# Patient Record
Sex: MALE | Race: WHITE | NOT HISPANIC OR LATINO | Employment: OTHER | URBAN - METROPOLITAN AREA
[De-identification: names, ages, dates, MRNs, and addresses within clinical notes are randomized per-mention and may not be internally consistent; named-entity substitution may affect disease eponyms.]

---

## 2019-10-21 ENCOUNTER — CONVERSION ENCOUNTER (OUTPATIENT)
Dept: GASTROENTEROLOGY | Facility: CLINIC | Age: 49
End: 2019-10-21
Attending: INTERNAL MEDICINE

## 2019-12-16 ENCOUNTER — HOSPITAL ENCOUNTER (OUTPATIENT)
Dept: GASTROENTEROLOGY | Facility: HOSPITAL | Age: 49
Setting detail: HOSPITAL OUTPATIENT SURGERY
Discharge: HOME | End: 2019-12-16
Attending: INTERNAL MEDICINE

## 2022-01-11 ENCOUNTER — HOSPITAL ENCOUNTER (INPATIENT)
Facility: HOSPITAL | Age: 52
LOS: 1 days | Discharge: HOME | End: 2022-01-12
Attending: EMERGENCY MEDICINE | Admitting: INTERNAL MEDICINE

## 2022-01-11 ENCOUNTER — APPOINTMENT (OUTPATIENT)
Dept: CARDIOLOGY | Facility: HOSPITAL | Age: 52
End: 2022-01-11

## 2022-01-11 ENCOUNTER — APPOINTMENT (OUTPATIENT)
Dept: GENERAL RADIOLOGY | Facility: HOSPITAL | Age: 52
End: 2022-01-11

## 2022-01-11 ENCOUNTER — APPOINTMENT (OUTPATIENT)
Dept: CT IMAGING | Facility: HOSPITAL | Age: 52
End: 2022-01-11

## 2022-01-11 DIAGNOSIS — R55 SYNCOPE, UNSPECIFIED SYNCOPE TYPE: ICD-10-CM

## 2022-01-11 DIAGNOSIS — S09.90XA INJURY OF HEAD, INITIAL ENCOUNTER: ICD-10-CM

## 2022-01-11 DIAGNOSIS — I48.91 NEW ONSET ATRIAL FIBRILLATION: Primary | ICD-10-CM

## 2022-01-11 DIAGNOSIS — S06.0X9A CONCUSSION WITH LOSS OF CONSCIOUSNESS, INITIAL ENCOUNTER: ICD-10-CM

## 2022-01-11 LAB
ALBUMIN SERPL-MCNC: 4.6 G/DL (ref 3.5–5.2)
ALBUMIN/GLOB SERPL: 2 G/DL
ALP SERPL-CCNC: 92 U/L (ref 39–117)
ALT SERPL W P-5'-P-CCNC: 13 U/L (ref 1–41)
ANION GAP SERPL CALCULATED.3IONS-SCNC: 10.5 MMOL/L (ref 5–15)
ANION GAP SERPL CALCULATED.3IONS-SCNC: 10.6 MMOL/L (ref 5–15)
ASCENDING AORTA: 3.3 CM
AST SERPL-CCNC: 19 U/L (ref 1–40)
BASOPHILS # BLD AUTO: 0.04 10*3/MM3 (ref 0–0.2)
BASOPHILS # BLD AUTO: 0.04 10*3/MM3 (ref 0–0.2)
BASOPHILS NFR BLD AUTO: 0.4 % (ref 0–1.5)
BASOPHILS NFR BLD AUTO: 0.4 % (ref 0–1.5)
BH CV ECHO MEAS - AO ROOT DIAM: 3.1 CM
BH CV ECHO MEAS - EDV(MOD-SP2): 80 ML
BH CV ECHO MEAS - EDV(MOD-SP4): 75 ML
BH CV ECHO MEAS - EF(MOD-BP): 60 %
BH CV ECHO MEAS - ESV(MOD-SP2): 24 ML
BH CV ECHO MEAS - ESV(MOD-SP4): 39 ML
BH CV ECHO MEAS - IVSD: 1 CM
BH CV ECHO MEAS - LA DIMENSION(2D): 3.7 CM
BH CV ECHO MEAS - LVIDD: 4.2 CM
BH CV ECHO MEAS - LVIDS: 3 CM
BH CV ECHO MEAS - LVOT DIAM: 2 CM
BH CV ECHO MEAS - LVPWD: 1 CM
BH CV ECHO MEAS - MV A MAX VEL: 23 CM/SEC
BH CV ECHO MEAS - MV DEC TIME: 188 MSEC
BH CV ECHO MEAS - MV E MAX VEL: 65 CM/SEC
BH CV ECHO MEAS - MV E/A: 2.9
BH CV ECHO MEAS - RAP SYSTOLE: 3 MMHG
BH CV ECHO MEAS - RVDD: 2.5 CM
BH CV ECHO MEAS - RVSP: 10 MMHG
BH CV ECHO MEAS - TR MAX PG: 7 MMHG
BH CV ECHO MEAS - TR MAX VEL: 134 CM/SEC
BILIRUB SERPL-MCNC: 0.5 MG/DL (ref 0–1.2)
BILIRUB UR QL STRIP: NEGATIVE
BUN SERPL-MCNC: 10 MG/DL (ref 6–20)
BUN SERPL-MCNC: 14 MG/DL (ref 6–20)
BUN/CREAT SERPL: 12 (ref 7–25)
BUN/CREAT SERPL: 16.1 (ref 7–25)
CALCIUM SPEC-SCNC: 8.7 MG/DL (ref 8.6–10.5)
CALCIUM SPEC-SCNC: 8.8 MG/DL (ref 8.6–10.5)
CHLORIDE SERPL-SCNC: 100 MMOL/L (ref 98–107)
CHLORIDE SERPL-SCNC: 96 MMOL/L (ref 98–107)
CLARITY UR: CLEAR
CO2 SERPL-SCNC: 24.4 MMOL/L (ref 22–29)
CO2 SERPL-SCNC: 26.5 MMOL/L (ref 22–29)
COLOR UR: YELLOW
CREAT SERPL-MCNC: 0.83 MG/DL (ref 0.76–1.27)
CREAT SERPL-MCNC: 0.87 MG/DL (ref 0.76–1.27)
DEPRECATED RDW RBC AUTO: 39.8 FL (ref 37–54)
DEPRECATED RDW RBC AUTO: 40.4 FL (ref 37–54)
EOSINOPHIL # BLD AUTO: 0.06 10*3/MM3 (ref 0–0.4)
EOSINOPHIL # BLD AUTO: 0.17 10*3/MM3 (ref 0–0.4)
EOSINOPHIL NFR BLD AUTO: 0.6 % (ref 0.3–6.2)
EOSINOPHIL NFR BLD AUTO: 1.8 % (ref 0.3–6.2)
ERYTHROCYTE [DISTWIDTH] IN BLOOD BY AUTOMATED COUNT: 12.4 % (ref 12.3–15.4)
ERYTHROCYTE [DISTWIDTH] IN BLOOD BY AUTOMATED COUNT: 12.6 % (ref 12.3–15.4)
ETHANOL BLD-MCNC: <10 MG/DL (ref 0–10)
ETHANOL UR QL: <0.01 %
GFR SERPL CREATININE-BSD FRML MDRD: 92 ML/MIN/1.73
GFR SERPL CREATININE-BSD FRML MDRD: 97 ML/MIN/1.73
GLOBULIN UR ELPH-MCNC: 2.3 GM/DL
GLUCOSE BLDC GLUCOMTR-MCNC: 119 MG/DL (ref 70–99)
GLUCOSE SERPL-MCNC: 133 MG/DL (ref 65–99)
GLUCOSE SERPL-MCNC: 178 MG/DL (ref 65–99)
GLUCOSE UR STRIP-MCNC: NEGATIVE MG/DL
HCT VFR BLD AUTO: 43.1 % (ref 37.5–51)
HCT VFR BLD AUTO: 43.4 % (ref 37.5–51)
HGB BLD-MCNC: 15.2 G/DL (ref 13–17.7)
HGB BLD-MCNC: 15.3 G/DL (ref 13–17.7)
HGB UR QL STRIP.AUTO: NEGATIVE
HOLD SPECIMEN: NORMAL
IMM GRANULOCYTES # BLD AUTO: 0.03 10*3/MM3 (ref 0–0.05)
IMM GRANULOCYTES # BLD AUTO: 0.05 10*3/MM3 (ref 0–0.05)
IMM GRANULOCYTES NFR BLD AUTO: 0.3 % (ref 0–0.5)
IMM GRANULOCYTES NFR BLD AUTO: 0.5 % (ref 0–0.5)
IVRT: 92 MSEC
KETONES UR QL STRIP: NEGATIVE
LEUKOCYTE ESTERASE UR QL STRIP.AUTO: NEGATIVE
LYMPHOCYTES # BLD AUTO: 1.92 10*3/MM3 (ref 0.7–3.1)
LYMPHOCYTES # BLD AUTO: 2.39 10*3/MM3 (ref 0.7–3.1)
LYMPHOCYTES NFR BLD AUTO: 19.1 % (ref 19.6–45.3)
LYMPHOCYTES NFR BLD AUTO: 25.1 % (ref 19.6–45.3)
MAGNESIUM SERPL-MCNC: 2 MG/DL (ref 1.6–2.6)
MAGNESIUM SERPL-MCNC: 2.1 MG/DL (ref 1.6–2.6)
MAXIMAL PREDICTED HEART RATE: 168 BPM
MCH RBC QN AUTO: 30.9 PG (ref 26.6–33)
MCH RBC QN AUTO: 30.9 PG (ref 26.6–33)
MCHC RBC AUTO-ENTMCNC: 35.3 G/DL (ref 31.5–35.7)
MCHC RBC AUTO-ENTMCNC: 35.3 G/DL (ref 31.5–35.7)
MCV RBC AUTO: 87.6 FL (ref 79–97)
MCV RBC AUTO: 87.7 FL (ref 79–97)
MONOCYTES # BLD AUTO: 0.37 10*3/MM3 (ref 0.1–0.9)
MONOCYTES # BLD AUTO: 0.57 10*3/MM3 (ref 0.1–0.9)
MONOCYTES NFR BLD AUTO: 3.7 % (ref 5–12)
MONOCYTES NFR BLD AUTO: 6 % (ref 5–12)
NEUTROPHILS NFR BLD AUTO: 6.34 10*3/MM3 (ref 1.7–7)
NEUTROPHILS NFR BLD AUTO: 66.4 % (ref 42.7–76)
NEUTROPHILS NFR BLD AUTO: 7.62 10*3/MM3 (ref 1.7–7)
NEUTROPHILS NFR BLD AUTO: 75.7 % (ref 42.7–76)
NITRITE UR QL STRIP: NEGATIVE
NRBC BLD AUTO-RTO: 0 /100 WBC (ref 0–0.2)
NRBC BLD AUTO-RTO: 0 /100 WBC (ref 0–0.2)
PH UR STRIP.AUTO: 8 [PH] (ref 5–8)
PHOSPHATE SERPL-MCNC: 3.3 MG/DL (ref 2.5–4.5)
PLATELET # BLD AUTO: 236 10*3/MM3 (ref 140–450)
PLATELET # BLD AUTO: 254 10*3/MM3 (ref 140–450)
PMV BLD AUTO: 9.1 FL (ref 6–12)
PMV BLD AUTO: 9.5 FL (ref 6–12)
POTASSIUM SERPL-SCNC: 3.8 MMOL/L (ref 3.5–5.2)
POTASSIUM SERPL-SCNC: 3.8 MMOL/L (ref 3.5–5.2)
PROT SERPL-MCNC: 6.9 G/DL (ref 6–8.5)
PROT UR QL STRIP: NEGATIVE
QT INTERVAL: 364 MS
RBC # BLD AUTO: 4.92 10*6/MM3 (ref 4.14–5.8)
RBC # BLD AUTO: 4.95 10*6/MM3 (ref 4.14–5.8)
SODIUM SERPL-SCNC: 133 MMOL/L (ref 136–145)
SODIUM SERPL-SCNC: 135 MMOL/L (ref 136–145)
SP GR UR STRIP: 1.01 (ref 1–1.03)
STRESS TARGET HR: 143 BPM
T4 FREE SERPL-MCNC: 1.5 NG/DL (ref 0.93–1.7)
TROPONIN I SERPL-MCNC: 0.01 NG/ML (ref 0–0.6)
TROPONIN T SERPL-MCNC: <0.01 NG/ML (ref 0–0.03)
TSH SERPL DL<=0.05 MIU/L-ACNC: 2.56 UIU/ML (ref 0.27–4.2)
UROBILINOGEN UR QL STRIP: NORMAL
WBC NRBC COR # BLD: 10.06 10*3/MM3 (ref 3.4–10.8)
WBC NRBC COR # BLD: 9.54 10*3/MM3 (ref 3.4–10.8)
WHOLE BLOOD HOLD SPECIMEN: NORMAL
WHOLE BLOOD HOLD SPECIMEN: NORMAL

## 2022-01-11 PROCEDURE — 83735 ASSAY OF MAGNESIUM: CPT | Performed by: HOSPITALIST

## 2022-01-11 PROCEDURE — 25010000002 AMIODARONE IN DEXTROSE 5% 360-4.14 MG/200ML-% SOLUTION: Performed by: INTERNAL MEDICINE

## 2022-01-11 PROCEDURE — 25010000002 AMIODARONE IN DEXTROSE 5% 150-4.21 MG/100ML-% SOLUTION: Performed by: INTERNAL MEDICINE

## 2022-01-11 PROCEDURE — 71045 X-RAY EXAM CHEST 1 VIEW: CPT

## 2022-01-11 PROCEDURE — 63710000001 PROMETHAZINE PER 12.5 MG: Performed by: INTERNAL MEDICINE

## 2022-01-11 PROCEDURE — 99284 EMERGENCY DEPT VISIT MOD MDM: CPT

## 2022-01-11 PROCEDURE — 25010000002 ONDANSETRON PER 1 MG: Performed by: EMERGENCY MEDICINE

## 2022-01-11 PROCEDURE — 93306 TTE W/DOPPLER COMPLETE: CPT

## 2022-01-11 PROCEDURE — 84484 ASSAY OF TROPONIN QUANT: CPT

## 2022-01-11 PROCEDURE — 93306 TTE W/DOPPLER COMPLETE: CPT | Performed by: INTERNAL MEDICINE

## 2022-01-11 PROCEDURE — 70450 CT HEAD/BRAIN W/O DYE: CPT

## 2022-01-11 PROCEDURE — 93005 ELECTROCARDIOGRAM TRACING: CPT

## 2022-01-11 PROCEDURE — 82962 GLUCOSE BLOOD TEST: CPT

## 2022-01-11 PROCEDURE — 84443 ASSAY THYROID STIM HORMONE: CPT | Performed by: EMERGENCY MEDICINE

## 2022-01-11 PROCEDURE — 84484 ASSAY OF TROPONIN QUANT: CPT | Performed by: EMERGENCY MEDICINE

## 2022-01-11 PROCEDURE — 82077 ASSAY SPEC XCP UR&BREATH IA: CPT | Performed by: EMERGENCY MEDICINE

## 2022-01-11 PROCEDURE — 99253 IP/OBS CNSLTJ NEW/EST LOW 45: CPT | Performed by: INTERNAL MEDICINE

## 2022-01-11 PROCEDURE — 93005 ELECTROCARDIOGRAM TRACING: CPT | Performed by: EMERGENCY MEDICINE

## 2022-01-11 PROCEDURE — 99223 1ST HOSP IP/OBS HIGH 75: CPT | Performed by: HOSPITALIST

## 2022-01-11 PROCEDURE — 72125 CT NECK SPINE W/O DYE: CPT

## 2022-01-11 PROCEDURE — 80053 COMPREHEN METABOLIC PANEL: CPT | Performed by: EMERGENCY MEDICINE

## 2022-01-11 PROCEDURE — 85025 COMPLETE CBC W/AUTO DIFF WBC: CPT | Performed by: EMERGENCY MEDICINE

## 2022-01-11 PROCEDURE — 81003 URINALYSIS AUTO W/O SCOPE: CPT | Performed by: EMERGENCY MEDICINE

## 2022-01-11 PROCEDURE — 83735 ASSAY OF MAGNESIUM: CPT | Performed by: EMERGENCY MEDICINE

## 2022-01-11 PROCEDURE — 85025 COMPLETE CBC W/AUTO DIFF WBC: CPT | Performed by: HOSPITALIST

## 2022-01-11 PROCEDURE — 84439 ASSAY OF FREE THYROXINE: CPT | Performed by: EMERGENCY MEDICINE

## 2022-01-11 PROCEDURE — 93010 ELECTROCARDIOGRAM REPORT: CPT | Performed by: INTERNAL MEDICINE

## 2022-01-11 PROCEDURE — 84100 ASSAY OF PHOSPHORUS: CPT | Performed by: HOSPITALIST

## 2022-01-11 RX ORDER — ACETAMINOPHEN 650 MG/1
650 SUPPOSITORY RECTAL EVERY 4 HOURS PRN
Status: DISCONTINUED | OUTPATIENT
Start: 2022-01-11 | End: 2022-01-12 | Stop reason: HOSPADM

## 2022-01-11 RX ORDER — SODIUM CHLORIDE 0.9 % (FLUSH) 0.9 %
10 SYRINGE (ML) INJECTION AS NEEDED
Status: DISCONTINUED | OUTPATIENT
Start: 2022-01-11 | End: 2022-01-12 | Stop reason: HOSPADM

## 2022-01-11 RX ORDER — ACETAMINOPHEN 325 MG/1
650 TABLET ORAL EVERY 4 HOURS PRN
Status: DISCONTINUED | OUTPATIENT
Start: 2022-01-11 | End: 2022-01-12 | Stop reason: HOSPADM

## 2022-01-11 RX ORDER — BISACODYL 5 MG/1
5 TABLET, DELAYED RELEASE ORAL DAILY PRN
Status: DISCONTINUED | OUTPATIENT
Start: 2022-01-11 | End: 2022-01-12 | Stop reason: HOSPADM

## 2022-01-11 RX ORDER — ACETAMINOPHEN 325 MG/1
650 TABLET ORAL ONCE
Status: COMPLETED | OUTPATIENT
Start: 2022-01-11 | End: 2022-01-11

## 2022-01-11 RX ORDER — SODIUM CHLORIDE 9 MG/ML
100 INJECTION, SOLUTION INTRAVENOUS CONTINUOUS
Status: DISCONTINUED | OUTPATIENT
Start: 2022-01-11 | End: 2022-01-11

## 2022-01-11 RX ORDER — POLYETHYLENE GLYCOL 3350 17 G/17G
17 POWDER, FOR SOLUTION ORAL DAILY PRN
Status: DISCONTINUED | OUTPATIENT
Start: 2022-01-11 | End: 2022-01-12 | Stop reason: HOSPADM

## 2022-01-11 RX ORDER — ACETAMINOPHEN 325 MG/1
650 TABLET ORAL EVERY 4 HOURS PRN
Status: DISCONTINUED | OUTPATIENT
Start: 2022-01-11 | End: 2022-01-11

## 2022-01-11 RX ORDER — PROMETHAZINE HYDROCHLORIDE 12.5 MG/1
12.5 TABLET ORAL ONCE
Status: COMPLETED | OUTPATIENT
Start: 2022-01-11 | End: 2022-01-11

## 2022-01-11 RX ORDER — ACETAMINOPHEN 650 MG/1
650 SUPPOSITORY RECTAL EVERY 4 HOURS PRN
Status: DISCONTINUED | OUTPATIENT
Start: 2022-01-11 | End: 2022-01-11

## 2022-01-11 RX ORDER — AMOXICILLIN 250 MG
2 CAPSULE ORAL 2 TIMES DAILY
Status: DISCONTINUED | OUTPATIENT
Start: 2022-01-11 | End: 2022-01-12 | Stop reason: HOSPADM

## 2022-01-11 RX ORDER — CHOLECALCIFEROL (VITAMIN D3) 125 MCG
5 CAPSULE ORAL NIGHTLY PRN
Status: DISCONTINUED | OUTPATIENT
Start: 2022-01-11 | End: 2022-01-12 | Stop reason: HOSPADM

## 2022-01-11 RX ORDER — DILTIAZEM HYDROCHLORIDE 5 MG/ML
10 INJECTION INTRAVENOUS ONCE
Status: COMPLETED | OUTPATIENT
Start: 2022-01-11 | End: 2022-01-11

## 2022-01-11 RX ORDER — ONDANSETRON 4 MG/1
4 TABLET, FILM COATED ORAL EVERY 6 HOURS PRN
Status: DISCONTINUED | OUTPATIENT
Start: 2022-01-11 | End: 2022-01-12 | Stop reason: HOSPADM

## 2022-01-11 RX ORDER — SODIUM CHLORIDE 0.9 % (FLUSH) 0.9 %
10 SYRINGE (ML) INJECTION EVERY 12 HOURS SCHEDULED
Status: DISCONTINUED | OUTPATIENT
Start: 2022-01-11 | End: 2022-01-12 | Stop reason: HOSPADM

## 2022-01-11 RX ORDER — ONDANSETRON 2 MG/ML
4 INJECTION INTRAMUSCULAR; INTRAVENOUS EVERY 6 HOURS PRN
Status: DISCONTINUED | OUTPATIENT
Start: 2022-01-11 | End: 2022-01-12 | Stop reason: HOSPADM

## 2022-01-11 RX ORDER — BISACODYL 10 MG
10 SUPPOSITORY, RECTAL RECTAL DAILY PRN
Status: DISCONTINUED | OUTPATIENT
Start: 2022-01-11 | End: 2022-01-12 | Stop reason: HOSPADM

## 2022-01-11 RX ORDER — ACETAMINOPHEN 160 MG/5ML
650 SOLUTION ORAL EVERY 4 HOURS PRN
Status: DISCONTINUED | OUTPATIENT
Start: 2022-01-11 | End: 2022-01-12 | Stop reason: HOSPADM

## 2022-01-11 RX ORDER — ONDANSETRON 2 MG/ML
4 INJECTION INTRAMUSCULAR; INTRAVENOUS ONCE
Status: COMPLETED | OUTPATIENT
Start: 2022-01-11 | End: 2022-01-11

## 2022-01-11 RX ORDER — ACETAMINOPHEN 160 MG/5ML
650 SOLUTION ORAL EVERY 4 HOURS PRN
Status: DISCONTINUED | OUTPATIENT
Start: 2022-01-11 | End: 2022-01-11

## 2022-01-11 RX ORDER — NITROGLYCERIN 0.4 MG/1
0.4 TABLET SUBLINGUAL
Status: DISCONTINUED | OUTPATIENT
Start: 2022-01-11 | End: 2022-01-12 | Stop reason: HOSPADM

## 2022-01-11 RX ADMIN — AMIODARONE HYDROCHLORIDE 150 MG: 1.5 INJECTION, SOLUTION INTRAVENOUS at 10:20

## 2022-01-11 RX ADMIN — APIXABAN 5 MG: 5 TABLET, FILM COATED ORAL at 20:08

## 2022-01-11 RX ADMIN — SODIUM CHLORIDE 100 ML/HR: 9 INJECTION, SOLUTION INTRAVENOUS at 05:29

## 2022-01-11 RX ADMIN — SODIUM CHLORIDE, PRESERVATIVE FREE 10 ML: 5 INJECTION INTRAVENOUS at 20:07

## 2022-01-11 RX ADMIN — METOPROLOL TARTRATE 12.5 MG: 25 TABLET, FILM COATED ORAL at 20:07

## 2022-01-11 RX ADMIN — AMIODARONE HYDROCHLORIDE 0.5 MG/MIN: 1.8 INJECTION, SOLUTION INTRAVENOUS at 16:38

## 2022-01-11 RX ADMIN — DILTIAZEM HYDROCHLORIDE 10 MG: 5 INJECTION INTRAVENOUS at 03:35

## 2022-01-11 RX ADMIN — METOPROLOL TARTRATE 12.5 MG: 25 TABLET, FILM COATED ORAL at 16:34

## 2022-01-11 RX ADMIN — ONDANSETRON 4 MG: 2 INJECTION INTRAMUSCULAR; INTRAVENOUS at 03:41

## 2022-01-11 RX ADMIN — APIXABAN 5 MG: 5 TABLET, FILM COATED ORAL at 10:20

## 2022-01-11 RX ADMIN — METOPROLOL TARTRATE 12.5 MG: 25 TABLET, FILM COATED ORAL at 06:06

## 2022-01-11 RX ADMIN — PROMETHAZINE HYDROCHLORIDE 12.5 MG: 12.5 TABLET ORAL at 20:54

## 2022-01-11 RX ADMIN — ACETAMINOPHEN 650 MG: 325 TABLET ORAL at 03:34

## 2022-01-11 RX ADMIN — SODIUM CHLORIDE 100 ML/HR: 9 INJECTION, SOLUTION INTRAVENOUS at 15:57

## 2022-01-11 RX ADMIN — SENNOSIDES AND DOCUSATE SODIUM 2 TABLET: 50; 8.6 TABLET ORAL at 20:08

## 2022-01-11 RX ADMIN — METOPROLOL TARTRATE 12.5 MG: 25 TABLET, FILM COATED ORAL at 09:06

## 2022-01-11 RX ADMIN — AMIODARONE HYDROCHLORIDE 1 MG/MIN: 1.8 INJECTION, SOLUTION INTRAVENOUS at 10:47

## 2022-01-11 RX ADMIN — ACETAMINOPHEN 650 MG: 325 TABLET ORAL at 16:45

## 2022-01-11 NOTE — CASE MANAGEMENT/SOCIAL WORK
Discharge Planning Assessment   [facility]     Patient Name: Jorje Toro  MRN: [id number]  Today's Date: 1/11/2022    Admit Date: 1/11/2022     Discharge Needs Assessment     Row Name 01/11/22 1359       Living Environment    Lives With child(gato), dependent; spouse    Current Living Arrangements home/apartment/condo    Duration at Residence 2 1/2 years    Primary Care Provided by self    Provides Primary Care For child(gato)    Family Caregiver if Needed spouse    Quality of Family Relationships helpful; supportive; involved    Able to Return to Prior Arrangements yes       Transition Planning    Patient/Family Anticipates Transition to home with family       Discharge Needs Assessment    Readmission Within the Last 30 Days no previous admission in last 30 days    Equipment Currently Used at Home cane, straight; walker, rolling    Concerns to be Addressed no discharge needs identified    Equipment Needed After Discharge none    Discharge Coordination/Progress Pt has been vaccinated for COVID, Pt has good support from family and friends.  Pt is active duty: [employer] Pt plans to return home independently. Pt has PCP and will use [facility] if needed.  SW will continue to follow as needed.               Discharge Plan     Row Name 01/11/22 4059       Plan    Plan Pt has been vaccinated for COVID, Pt has good support from family and friends.  Pt is active duty: [employer], Pt plans to return home independently. Pt has PCP and will use [facility] if needed.  SW will continue to follow as needed.              Continued Care and Services - Admitted Since 1/11/2022    Coordination has not been started for this encounter.          Demographic Summary     Row Name 01/11/22 1356       General Information    Admission Type inpatient    Arrived From home    Referral Source admission list    Reason for Consult discharge planning    Preferred Language English     Used During This Interaction no       Contact  Information    Contact Information Obtained for lay caregiver    Contact Information Comments Dorothy Guidoon       Lay Caregiver Information    Name, Lay Caregiver [phone]    Phone, Lay Caregiver [phone]               Functional Status     Row Name 01/11/22 1352       Functional Status    Usual Activity Tolerance excellent    Current Activity Tolerance excellent       Functional Status, IADL    Medications independent    Meal Preparation independent    Housekeeping independent    Laundry independent    Shopping independent       Mental Status    General Appearance WDL WDL       Mental Status Summary    Recent Changes in Mental Status/Cognitive Functioning no changes       Employment/    Employment Status , active duty           Active Duty Status active duty     [employer]               Psychosocial    No documentation.                Abuse/Neglect    No documentation.                Legal     Row Name 01/11/22 9858       Financial/Legal    Source of Income salary/wages       Legal    Criminal Activity/Legal Involvement none               Substance Abuse    No documentation.                Patient Forms    No documentation.                   Yesenia Nielson

## 2022-01-11 NOTE — CONSULTS
Cardiology Consult Note  [facility] CARE UNIT 2          Patient Identification:  Jorje Toro      [id number]  51 y.o.        male  1970           Reason for Consultation: A. fib and syncope    PCP: Wilma Chairez MD    History of Present Illness:     Patient is a 51-year-old gentleman with no significant past medical problems other than some borderline dyslipidemia who presented to yesterday after he had gotten up to use the restroom became nauseated and noted he was on the floor attempted to get up again had a recurrent fall and loss of consciousness was noted to be diaphoretic it was not clear if the patient technically passed out or just was not coherent.  He did experience some nausea and pounding headache as well felt like he had drank too much alcohol.  When EMS arrived patient was hooked up he was found to be in atrial fibrillation with controlled ventricular rate.  He denies any chest pain shortness of breath he been feeling normally up until this he did have a viral illness over [place] but did not been have any recent fever chills or cough      Past History:  History reviewed. No pertinent past medical history.  Past Surgical History:   Procedure Laterality Date   • COLONOSCOPY       No Known Allergies  Social History     Socioeconomic History   • Marital status:    Tobacco Use   • Smoking status: Never Smoker   • Smokeless tobacco: Never Used   Substance and Sexual Activity   • Alcohol use: Yes     Comment: occasionally     History reviewed. No pertinent family history.  Not significant premature CAD  Medications:  Prior to Admission medications    Not on File      Current medications:  apixaban, 5 mg, Oral, Q12H  metoprolol tartrate, 12.5 mg, Oral, Q6H  senna-docusate sodium, 2 tablet, Oral, BID  sodium chloride, 10 mL, Intravenous, Q12H      Current IV drips:  amiodarone, 1 mg/min   Followed by  amiodarone, 0.5 mg/min  sodium chloride, 100 mL/hr, Last Rate: 100  "mL/hr (01/11/22 0529)        Review of Systems   Constitutional: Positive for diaphoresis. Negative for chills, fever and weight loss.   HENT: Negative for congestion and nosebleeds.    Cardiovascular: Negative for orthopnea and paroxysmal nocturnal dyspnea.   Respiratory: Negative for cough and shortness of breath.    Endocrine: Negative for cold intolerance and heat intolerance.   Skin: Negative for rash.   Musculoskeletal: Negative for back pain and muscle weakness.   Gastrointestinal: Positive for nausea. Negative for abdominal pain and vomiting.   Genitourinary: Negative for dysuria and nocturia.   Neurological: Negative for dizziness and light-headedness.   Psychiatric/Behavioral: Negative for altered mental status and hallucinations.         Physical Exam    BP 91/59 (BP Location: Left arm, Patient Position: Lying)   Pulse 65   Temp 97.2 °F (36.2 °C) (Oral)   Resp 17   Ht 193 cm (76\")   Wt 84.7 kg (186 lb 11.7 oz)   SpO2 98%   BMI 22.73 kg/m²  Body mass index is 22.73 kg/m².   Oxygen saturation   @FLOWAN(10::1)@ SpO2  Min: 97 %  Max: 100 %    General Appearance:   · no acute distress  · Alert and oriented x3  HENT:   · lips not cyanotic  · Atraumatic  Neck:  · thyroid not enlarged  · supple  Respiratory:  · no respiratory distress  · normal breath sounds  · no rales  Cardiovascular:  · no jugular venous distention  · regular rhythm  · apical impulse normal  · S1 normal, S2 normal  · no S3, no S4   · no murmur  · no rub, no thrill  · no carotid bruit  · pedal pulses normal  · lower extremity edema: none    Gastrointestinal:   · bowel sounds normal  · non-tender  · no hepatomegaly, no splenomegaly  Musculoskeletal:  · no clubbing of fingers.   · normocephalic, head atraumatic  Skin:   · warm, dry  · No rashes  Neuro/Psychiatric:  · normal mood and affect  · judgement and insight appropriate      Cardiographics:     ECG  (personally reviewed)        Telemetry:  (personally reviewed) BRANDI cooper with controlled " ventricular rate   Results for orders placed during the hospital encounter of 01/11/22    Adult Transthoracic Echo Complete w/ Color, Spectral and Contrast if necessary per protocol    Interpretation Summary  · Estimated left ventricular EF was in agreement with the calculated left ventricular EF. Left ventricular ejection fraction appears to be 56 - 60%. Left ventricular systolic function is normal.  · Left ventricular diastolic function is consistent with (grade III w/high LAP) fixed restrictive pattern.  · Estimated right ventricular systolic pressure from tricuspid regurgitation is normal (<35 mmHg). Calculated right ventricular systolic pressure from tricuspid regurgitation is 10 mmHg.  · Mild centrally directed mitral valve regurgitation is noted         No results found for this or any previous visit.      Cardiolite (Tc-99m Sestamibi) stress test   Lab Review:       CBC    CBC 1/11/22   WBC 9.54   RBC 4.95   Hemoglobin 15.3   Hematocrit 43.4   MCV 87.7   MCH 30.9   MCHC 35.3   RDW 12.4   Platelets 236             CMP    CMP 1/11/22   Glucose 133 (A)   BUN 14   Creatinine 0.87   eGFR Non African Am 92   Sodium 133 (A)   Potassium 3.8   Chloride 96 (A)   Calcium 8.7   Albumin 4.60   Total Bilirubin 0.5   Alkaline Phosphatase 92   AST (SGOT) 19   ALT (SGPT) 13   (A) Abnormal value       Comments are available for some flowsheets but are not being displayed.              CARDIAC LABS:      Lab 01/11/22  0149   TROPONIN T <0.010      No results found for: DIGOXIN   Lab Results   Component Value Date    TSH 2.560 01/11/2022           Invalid input(s): LDLCALC  Lab Results   Component Value Date    POCTROP 0.01 01/11/2022     No components found for: DDIMERQUAN  Lab Results   Component Value Date    MG 2.1 01/11/2022             CARDIAC LABS:      Lab 01/11/22  0149   TROPONIN T <0.010      Echocardiogram prelim normal EF 55% mild right atrial enlargement no underlying valvular heart issues    Imaging:  CXR  No  acute disease     Assessment:    New onset atrial fibrillation (HCC)    Syncope and collapse      Patient with new onset of atrial fibrillation and syncopal/presyncopal episode sounds consistent with a vasovagal spell.  Possibly that his atrial relation may have been vagally mediated he has not had any rate control issues either tachycardic or bradycardic in nature.  He is resting comfortably given that this is his first episode of atrial fibrillation and that his Alber Vascor is 0 discussed with him and attempted converting back to normal sinus rhythm as his atrial fibrillation may be contributing to his presyncopal episodes last night.  He was agreeable after going to risk benefits and alternatives.  Recommended initially to start on amiodarone IV    Plan:  1.  Amiodarone IV load  2.  Eliquis 5 twice daily since attempting to cardiovert but if successful patient could come off of in a month and just go to chronic aspirin he 1 mg once a day  3.  Continue monitor on telemetry        Thank you for allowing us to share in [facility].            Jack Perez MD   1/11/2022    10:42 EST

## 2022-01-11 NOTE — SIGNIFICANT NOTE
01/11/22 1340   Coping/Psychosocial   Observed Emotional State calm; cooperative   Verbalized Emotional State hopefulness   Trust Relationship/Rapport empathic listening provided   Involvement in Care interacting with patient   Additional Documentation Spiritual Care (Group)   Spiritual Care   Use of Spiritual Resources non-Rastafarian use of spiritual care   Spiritual Care Source  initiative   Spiritual Care Follow-Up follow-up, none required as presently assessed   Response to Spiritual Care receptive of support; thanks expressed   Spiritual Care Interventions supportive conversation provided   Spiritual Care Visit Type initial   Receptivity to Spiritual Care visit welcomed

## 2022-01-11 NOTE — PLAN OF CARE
Goal Outcome Evaluation:   VSS. Amio gtt @ 16.6 mL/hr. Still in afib on the monitor in the 60s & 70s. Will continue to monitor.

## 2022-01-11 NOTE — ED PROVIDER NOTES
"Time: 2:56 AM EST  Arrived by: Ambulance  Chief Complaint: Fall  History provided by: Patient, family, and EMS  History is limited by: N/A     History of Present Illness:    Jorje Toro is a 51 y.o. male who presents to the emergency department today with complaints of a fall. The patient reports that while he was getting up off the couch to go urinate, he became nauseous and experienced a fall. He did hit his head on the wall and had positive loss of consciousness. He does note a \"pounding\" headache following the injury. He is unsure how long he was unconscious in total. The patient denies any headache or nausea prior to hitting his head.     The patient was able to stand on his own, though he states that he became nauseous upon taking a few steps and experienced a second syncopal episode with fall in the hallway. His daughter did witness the second event. He believes that the second episode lasted a few seconds in total. Per his wife, the patient appeared very diaphoretic at that time.    Per EMS, the patient was in atrial fibrillation en route, though the patient denies a prior history of atrial fibrillation.    The patient denies any chest pain, chest pressure, shortness of breath, or palpitations. He denies any unilateral swelling in his legs.    No pertinent medical history is presently on file. The patient denies smoking, but does drink occasionally. He denies drug use. The patient did drink caffeine this morning but states that it was his baseline amount. He denies a known family history of coronary artery disease. The patient did have extended travel recently of 12-13 hours. The patient denies a prior history of DVT or PE. He denies recent surgeries or prior cancer diagnosis. There are no other acute complaints at this time.        History provided by:  Patient (Family)   used: No    Fall  Mechanism of injury: fall    Injury location:  Head/neck  Head/neck injury location:  " Head  Incident location:  Home  Time since incident:  2 hours (PTA)  Arrived directly from scene: yes    Fall:     Fall occurred:  Standing    Impact surface: Wall.    Point of impact:  Head  Protective equipment: none    Suspicion of alcohol use: no    Suspicion of drug use: no    Tetanus status:  Unknown  Prior to arrival data:     Loss of consciousness: yes      Amnesic to event: Patient did lose consciousness but remembers hitting his head.    Associated symptoms: headaches, loss of consciousness and nausea    Associated symptoms: no back pain, no chest pain and no neck pain    Risk factors: no CAD    Risk factors comment:  No pertinent medical history on file. Patient denies prior history of a fib.      Similar Symptoms Previously: No.  Recently seen: Patient has not been seen recently. Most recent visit was on 12/16/2019 with Dr. Mahajan GI.      Patient Care Team  Primary Care Provider: Provider, No Known    Past Medical History:     No Known Allergies  History reviewed. No pertinent past medical history.  Past Surgical History:   Procedure Laterality Date   • COLONOSCOPY       History reviewed. No pertinent family history.    Home Medications:  Prior to Admission medications    Not on File        Social History:   Social History     Tobacco Use   • Smoking status: Never Smoker   • Smokeless tobacco: Never Used   Substance Use Topics   • Alcohol use: Yes     Comment: occasionally   • Drug use: Not on file         Review of Systems:  Review of Systems   Constitutional: Positive for diaphoresis. Negative for chills and fever.   HENT: Negative for nosebleeds.    Eyes: Negative for redness.   Respiratory: Negative for cough and shortness of breath.    Cardiovascular: Negative for chest pain, palpitations and leg swelling.   Gastrointestinal: Positive for nausea. Negative for diarrhea.   Genitourinary: Negative for dysuria and frequency.   Musculoskeletal: Negative for back pain and neck pain.   Skin: Negative for  "rash.   Neurological: Positive for loss of consciousness and headaches.        Head injury. Loss of consciousness.        Physical Exam:  /82   Pulse 114   Temp 97.5 °F (36.4 °C) (Oral)   Resp 20   Ht 188 cm (74\")   Wt 87.5 kg (192 lb 14.4 oz)   SpO2 100%   BMI 24.77 kg/m²     Physical Exam  Vitals and nursing note reviewed.   Constitutional:       General: He is not in acute distress.     Appearance: Normal appearance.   HENT:      Head: Normocephalic.      Comments: Erythema in the medial aspect of the forehead as well as left superior forehead. No laceration and no hematoma.     Nose: Nose normal.      Mouth/Throat:      Pharynx: Oropharynx is clear.   Eyes:      General: No scleral icterus.     Conjunctiva/sclera: Conjunctivae normal.   Neck:      Thyroid: No thyromegaly.   Cardiovascular:      Rate and Rhythm: Tachycardia present. Rhythm irregularly irregular.      Pulses: Normal pulses.      Heart sounds: Normal heart sounds. No murmur heard.       Comments: Good distal pulses.  Pulmonary:      Effort: No accessory muscle usage, respiratory distress or retractions.      Breath sounds: Normal breath sounds. No wheezing, rhonchi or rales.   Chest:      Chest wall: No tenderness.   Abdominal:      Palpations: Abdomen is soft.      Tenderness: There is no abdominal tenderness. There is no guarding or rebound.      Comments: No rigidity.   Musculoskeletal:         General: No tenderness. Normal range of motion.      Cervical back: Normal range of motion and neck supple. No tenderness or bony tenderness.      Thoracic back: No tenderness or bony tenderness.      Lumbar back: No tenderness or bony tenderness.      Right lower leg: No tenderness. No edema.      Left lower leg: No tenderness. No edema.      Comments: Good range of motion at 180 degrees with no midline pain.   Skin:     General: Skin is warm and dry.   Neurological:      Mental Status: He is alert. Mental status is at baseline. "   Psychiatric:         Mood and Affect: Mood normal.         Behavior: Behavior normal.                Medications in the Emergency Department:  Medications   sodium chloride 0.9 % flush 10 mL (has no administration in time range)   acetaminophen (TYLENOL) tablet 650 mg (650 mg Oral Given 1/11/22 0334)   ondansetron (ZOFRAN) injection 4 mg (4 mg Intravenous Given 1/11/22 0341)   dilTIAZem (CARDIZEM) injection 10 mg (10 mg Intravenous Given 1/11/22 0335)        Labs  Lab Results (last 24 hours)     Procedure Component Value Units Date/Time    CBC & Differential [[id number]]  (Normal) Collected: 01/11/22 0149    Specimen: Blood Updated: 01/11/22 0157    Narrative:      The following orders were created for panel order CBC & Differential.  Procedure                               Abnormality         Status                     ---------                               -----------         ------                     CBC Auto Differential[[id number]]        Normal              Final result                 Please view results for these tests on the individual orders.    Comprehensive Metabolic Panel [[id number]]  (Abnormal) Collected: 01/11/22 0149    Specimen: Blood Updated: 01/11/22 0218     Glucose 133 mg/dL      BUN 14 mg/dL      Creatinine 0.87 mg/dL      Sodium 133 mmol/L      Potassium 3.8 mmol/L      Comment: Slight hemolysis detected by analyzer. Results may be affected.        Chloride 96 mmol/L      CO2 26.5 mmol/L      Calcium 8.7 mg/dL      Total Protein 6.9 g/dL      Albumin 4.60 g/dL      ALT (SGPT) 13 U/L      AST (SGOT) 19 U/L      Alkaline Phosphatase 92 U/L      Total Bilirubin 0.5 mg/dL      eGFR Non African Amer 92 mL/min/1.73      Globulin 2.3 gm/dL      A/G Ratio 2.0 g/dL      BUN/Creatinine Ratio 16.1     Anion Gap 10.5 mmol/L     Narrative:      GFR Normal >60  Chronic Kidney Disease <60  Kidney Failure <15      Troponin [[id number]]  (Normal) Collected: 01/11/22 0149    Specimen: Blood Updated: 01/11/22  0218     Troponin T <0.010 ng/mL     Narrative:      Troponin T Reference Range:  <= 0.03 ng/mL-   Negative for AMI  >0.03 ng/mL-     Abnormal for myocardial necrosis.  Clinicians would have to utilize clinical acumen, EKG, Troponin and serial changes to determine if it is an Acute Myocardial Infarction or myocardial injury due to an underlying chronic condition.       Results may be falsely decreased if patient taking Biotin.      Magnesium [[id number]]  (Normal) Collected: 01/11/22 0149    Specimen: Blood Updated: 01/11/22 0218     Magnesium 2.1 mg/dL     CBC Auto Differential [[id number]]  (Normal) Collected: 01/11/22 0149    Specimen: Blood Updated: 01/11/22 0157     WBC 9.54 10*3/mm3      RBC 4.95 10*6/mm3      Hemoglobin 15.3 g/dL      Hematocrit 43.4 %      MCV 87.7 fL      MCH 30.9 pg      MCHC 35.3 g/dL      RDW 12.4 %      RDW-SD 39.8 fl      MPV 9.1 fL      Platelets 236 10*3/mm3      Neutrophil % 66.4 %      Lymphocyte % 25.1 %      Monocyte % 6.0 %      Eosinophil % 1.8 %      Basophil % 0.4 %      Immature Grans % 0.3 %      Neutrophils, Absolute 6.34 10*3/mm3      Lymphocytes, Absolute 2.39 10*3/mm3      Monocytes, Absolute 0.57 10*3/mm3      Eosinophils, Absolute 0.17 10*3/mm3      Basophils, Absolute 0.04 10*3/mm3      Immature Grans, Absolute 0.03 10*3/mm3      nRBC 0.0 /100 WBC     POC Troponin I with Hold Tube [[id number]] Collected: 01/11/22 0149    Specimen: Blood Updated: 01/11/22 0220    Narrative:      The following orders were created for panel order POC Troponin I with Hold Tube.  Procedure                               Abnormality         Status                     ---------                               -----------         ------                     POC Troponin I[[id number]]                                                              HOLD Troponin-I Tube[[id number]]                             Final result                 Please view results for these tests on the individual orders.    POC  Troponin I [[id number]]  (Normal) Collected: 01/11/22 0149    Specimen: Blood Updated: 01/11/22 0201     Troponin I 0.01 ng/mL      Comment: Serial Number: [id number]:  [id number]       T4, Free [[id number]]  (Normal) Collected: 01/11/22 0149    Specimen: Blood Updated: 01/11/22 0343     Free T4 1.50 ng/dL     Narrative:      Results may be falsely increased if patient taking Biotin.      TSH [[id number]]  (Normal) Collected: 01/11/22 0149    Specimen: Blood Updated: 01/11/22 0343     TSH 2.560 uIU/mL     Ethanol [[id number]] Collected: 01/11/22 0149    Specimen: Blood Updated: 01/11/22 0330     Ethanol <10 mg/dL      Ethanol % <0.010 %     Narrative:      Ethanol (Plasma)  <10 Essentially Negative    Toxic Concentrations           mg/dL    Flushing, slowing of reflexes    Impaired visual activity         Depression of CNS              >100  Possible Coma                  >300              Imaging:  CT Head Without Contrast    Result Date: 1/11/2022  PROCEDURE: CT HEAD WO CONTRAST  COMPARISON:  None INDICATIONS: HEAD INJURY, LOSS OF CONSCIOUSNESS  PROTOCOL:   Standard imaging protocol performed    RADIATION:      MA and/or KV was adjusted to minimize radiation dose.     TECHNIQUE: After obtaining the patient's consent, CT images were obtained without non-ionic intravenous contrast material.  FINDINGS:  There is no evidence for acute intracranial hemorrhage. No definitive acute focal ischemia is observed. There is no evidence for abnormal cerebral edema. No mass effect or midline shift is seen. The ventricular system is nondilated. The basilar cisterns are patent. The skull is intact without displaced fracture. The paranasal sinuses and mastoid air cells are clear.        1. No evidence for acute intracranial abnormality.    SMITA LOJA MD       Electronically Signed and Approved By: SMITA LOJA MD on 1/11/2022 at 3:07             CT Cervical Spine Without Contrast    Result Date:  1/11/2022  PROCEDURE: CT CERVICAL SPINE WO CONTRAST  COMPARISON: None  INDICATIONS: Neck pain, acute, no red flags  PROTOCOL:   Standard imaging protocol performed    RADIATION:   DLP: 33826lSf*cm   MA and/or KV was adjusted to minimize radiation dose.     TECHNIQUE: After obtaining the patient's consent, multi-planar CT images were created without contrast material.   FINDINGS:  There is no acute fracture or subluxation. The cervical vertebral body alignment is within normal limits. No focal osseous abnormalities are seen. The central canal and neural foramina are patent. There is no evidence for significant soft tissue hemorrhage or hematoma. No significant edema or abnormal fluid collection is seen. The mastoid air cells are clear. The lung apices are clear.      1. No evidence for acute fracture or subluxation. 2. No evidence for significant acute soft tissue abnormality.     SMITA LOJA MD       Electronically Signed and Approved By: SMITA LOJA MD on 1/11/2022 at 3:09             XR Chest 1 View    Result Date: 1/11/2022  PROCEDURE: XR CHEST 1 VW  COMPARISON: None  INDICATIONS: SYNCOPE, FALL, HEADACHE  FINDINGS:  No consolidations or pleural effusions are observed. The cardiac silhouette is within normal limits. The mediastinum is unremarkable. No definitive acute osseous abnormalities are seen on this single view.        1. No evidence for acute cardiopulmonary process.       SMITA LOJA MD       Electronically Signed and Approved By: SMITA LOJA MD on 1/11/2022 at 2:18               Procedures:  Procedures    Progress  ED Course as of 01/11/22 0347   Tue Jan 11, 2022   0319 EKG:    Rhythm: Atrial fibrillation  Rate: 95  Intervals: Normal QT interval  T-wave: Nonspecific T wave flattening in aVF  ST Segment: Nonspecific ST segment in V2, V3, V4, V5, J-point elevation in V6, II, III, no obvious pathological ST elevation or ST depression    EKG Comparison: None available    Interpreted by me   [SD]       ED Course User Index  [SD] Andres Khoury DO                         HEART Score (for prediction of 6-week risk of major adverse cardiac event) reviewed and/or performed as part of the patient evaluation and treatment planning process.  The result associated with this review/performance is: 3    PERC Rule (for pulmonary embolism) reviewed and/or performed as part of the patient evaluation and treatment planning process.  The result associated with this review/performance is: 0        Medical Decision Making:  MDM  Number of Diagnoses or Management Options  Diagnosis management comments: Albanian Syncope Risk Score - MDCalc  Calculated on Jan 11 2022 3:31 AM  2 points -> [place]ian Syncope Risk Score  Medium  risk -> 5.1% risk of 30-day serious adverse event (death, arrhythmia, MI - full list in Evidence)  This is based on the fact that the patient has new onset atrial fibrillation       Amount and/or Complexity of Data Reviewed  Clinical lab tests: reviewed  Tests in the radiology section of CPT®: reviewed  Tests in the medicine section of CPT®: reviewed                 Final diagnoses:   New onset atrial fibrillation (HCC)   Syncope, unspecified syncope type   Concussion with loss of consciousness, initial encounter   Injury of head, initial encounter        Disposition:  ED Disposition     ED Disposition Condition Comment    Decision to Admit            Part of this note may be an electronic transcription/translation of spoken language to printed text using the Dragon Dictation System.     Documentation assistance provided by Sintia Saleh acting as scribe for Andres Khoury DO. Information recorded by the scribe was done at my direction and has been verified and validated by me.        Sintia Saleh  01/11/22 0311       Andres Khoury DO  01/11/22 0981

## 2022-01-11 NOTE — PROGRESS NOTES
[facility]   Hospitalist Progress Note  Date: 2022  Patient Name: Jorje Toro  : 1970  MRN: [id number]  Date of admission: 2022  Consultants:   -Cardiology: Dr. Jack Perez    Subjective   Subjective     Chief Complaint: Fall    Summary:   Jorje Toro is a 51 y.o. male with no significant past medical history who presented to ED after syncopal episode x2 at home.  Evaluation in ED significant for patient being in atrial fibrillation, new diagnosis for the patient.  Imaging negative in the ED, CT scan did not show any acute fracture or subluxation.  Patient started on metoprolol and cardiology consulted to assist in management.    Interval Followup:   No acute events overnight.  Patient denies any chest pain, shortness breath, abdominal pain or vomiting.  Does endorse some nausea and headache.  Patient started on amiodarone per cardiology.  Nursing no additional acute issues to report.    Review of Systems   All systems reviewed and negative unless stated otherwise under subjective.    Objective   Objective     Vitals:   Temp:  [97.2 °F (36.2 °C)-98.2 °F (36.8 °C)] 98.2 °F (36.8 °C)  Heart Rate:  [] 75  Resp:  [14-20] 17  BP: ()/(47-92) 99/54  Physical Exam   Gen: No acute distress, Conversant, Pleasant, lying in bed  HEENT: MMM, Atraumatic  Neck: Supple, Trachea midline  Resp: CTAB, No w/r/r, No respiratory distress appreciated, equal chest was bilaterally  Card: IRIR, No m/r/g  Abd: Soft, Nontender, Nondistended, + bowel sounds  Ext: No cyanosis, No clubbing  Neuro: CN II-XII grossly intact, No focal deficits appreciated  Psych: AAO x 3, Normal mood, Normal affect    Result Review    Result Review:  I have personally reviewed the results from the time of this admission to 2022 18:19 EST and agree with these findings:  [x]  Laboratory:   [x]  Microbiology:   [x]  Radiology:   [x]  EKG/Telemetry:    []  Cardiology/Vascular:    []  Pathology:  []  Old records:  []   Other:    Assessment/Plan   Assessment / Plan     Assessment:  New onset atrial fibrillation  Syncope and collapse consistent with vasovagal spell  Head injury after syncope    Plan:  -Cardiology consulted and following, appreciate assistance and recommendations in the care of this patient.  -Patient started on amiodarone with hopes that patient will cardiovert per cardiology  -Patient started on Eliquis since plan is for cardioversion, if successful patient could come off of medication in a month and go on chronic aspirin 81 mg daily per cardiology  -Continue metoprolol  -Acetaminophen as needed for headache  -Discontinue IV fluids  -Will monitor electrolytes and renal function with BMP and magnesium level in the AM  -Will monitor WBC and Hgb with CBC in the AM  -Clinical course will dictate further management     DVT Prophylaxis: Eliquis  Diet: Regular  Dispo: Home when medically appropriate discharge  Code Status: Full code     Personally reviewed patients labs and imaging, discussed with patient and nurse at bedside. Discussed case with the following consultants: Cardiology.     Part of this note may be an electronic transcription/translation of spoken language to printed text using the Dragon dictation system.    DVT prophylaxis:  Medical DVT prophylaxis orders are present.    CODE STATUS:   Level Of Support Discussed With: Patient  Code Status (Patient has no pulse and is not breathing): CPR (Attempt to Resuscitate)  Medical Interventions (Patient has pulse or is breathing): Full Support        Electronically signed by Wilberto Bass MD, 01/11/22, 6:19 PM EST.

## 2022-01-11 NOTE — H&P
[facility]  [facility]   Patient Name: Jorje Toro  : 1970  MRN: [id number]  Primary Care Physician:  Provider, No Known  Date of admission: 2022    Subjective   Fall  Subjective   Chief Complaint:  Fall    HPI: Patient is a 51-year-old male who presents to the ER with a complaint of true syncope after getting up from the couch and going to urinate.  After urinating, he became nauseous diaphoretic and passed out.  He hit his head on the wall.  He does note a pounding headache following his head injury.  Patient is uncertain.  He denies any prodrome prior to hitting his head.    Then, the patient was able to stand up after took a few steps then again experienced a second syncopal event.  This event was witnessed by his daughter reports that the episode lasted only a couple of seconds.  His wife noticed that the patient seemed very diaphoretic.    On route to the EMS, the patient was in A. fib.  Patient has no history of A. Fib.    Patient is a social drinker denies smoking drinks caffeine.    In the ER, patient's temperature was 97.5, blood pressure was 122/77, pulse was 84, respiratory rate was 14, and he was saturating 100% on room air.    His head CT showed no acute intracranial abnormality.  Cervical spine CT showed no evidence of acute fracture or subluxation.  And there is no evidence of acute soft tissue abnormality.    His chest x-ray is negative for any acute cardiopulmonary process.    His ECG shows: A QT interval of 364, A. fib which is rate controlled, and early repolarization.    Personal History     Past Medical History:  History reviewed. No pertinent past medical history.    Past Surgical History:  Past Surgical History:   Procedure Laterality Date   • COLONOSCOPY         Family History:   History reviewed. No pertinent family history.    Social History:   Social History     Socioeconomic History   • Marital status:    Tobacco Use   •  Smoking status: Never Smoker   • Smokeless tobacco: Never Used   Substance and Sexual Activity   • Alcohol use: Yes     Comment: occasionally         Home Medications:     Patient is not on any medications at home    Allergies:  No Known Allergies    Review of Systems   All systems were reviewed and negative except for: Syncope, nausea, dizziness    Objective   Objective     Vitals:   Temp:  [97.5 °F (36.4 °C)] 97.5 °F (36.4 °C)  Heart Rate:  [] 114  Resp:  [14-20] 20  BP: (116-122)/(77-92) 122/82    Physical Exam    Constitutional: Awake, alert, no acute distress   Eyes: Pupils equal, sclerae anicteric, no conjunctival injection   HENT: NCAT, mucous membranes moist   Neck: Supple, no thyromegaly, no lymphadenopathy, trachea midline   Respiratory: Clear to auscultation bilaterally, nonlabored respirations    Cardiovascular: RRR, no murmurs, rubs, or gallops, palpable pedal pulses bilaterally   Gastrointestinal: Positive bowel sounds, soft, nontender, nondistended   Musculoskeletal: No bilateral ankle edema, no clubbing or cyanosis to extremities   Psychiatric: Appropriate affect, cooperative   Neurologic: Oriented x 3, strength symmetric in all extremities, Cranial Nerves grossly intact to confrontation, speech clear   Skin: No rashes     Result Review    Result Review:  I have personally reviewed the results from the time of this admission to 1/11/2022 03:57 EST and agree with these findings:  [x]  Laboratory  []  Microbiology  []  Radiology  []  EKG/Telemetry   [x]  Cardiology/Vascular   []  Pathology  [x]  Old records  []  Other:      Assessment/Plan   Assessment / Plan   #1 new onset silent A. Fib  -Start 12.5 mg of metoprolol every 6 hours  -Consult cardiology  -We will defer to cardiology if patient needs to be on a DOAC. Patient has hit his head will hold off for tonight.    -Get an echo  -Check TSH  -Keep mag-2, K-4    #2 syncope  -Seems vasovagal in nature  -Check orthostatics  -Gentle hydration    #3  head injury after syncope  -Supportive care        DVT prophylaxis:  Medical DVT prophylaxis orders are present.    CODE STATUS:    Level Of Support Discussed With: Patient  Code Status (Patient has no pulse and is not breathing): CPR (Attempt to Resuscitate)  Medical Interventions (Patient has pulse or is breathing): Full Support      Admission Status:  I believe this patient meets observation status.    Electronically signed by Alberto Bush DO, 01/11/22, 3:57 AM EST.

## 2022-01-12 VITALS
BODY MASS INDEX: 22.74 KG/M2 | HEIGHT: 76 IN | HEART RATE: 64 BPM | SYSTOLIC BLOOD PRESSURE: 106 MMHG | DIASTOLIC BLOOD PRESSURE: 64 MMHG | WEIGHT: 186.73 LBS | OXYGEN SATURATION: 100 % | TEMPERATURE: 98 F | RESPIRATION RATE: 20 BRPM

## 2022-01-12 LAB
ANION GAP SERPL CALCULATED.3IONS-SCNC: 7.2 MMOL/L (ref 5–15)
BASOPHILS # BLD AUTO: 0.06 10*3/MM3 (ref 0–0.2)
BASOPHILS NFR BLD AUTO: 0.6 % (ref 0–1.5)
BUN SERPL-MCNC: 9 MG/DL (ref 6–20)
BUN/CREAT SERPL: 8 (ref 7–25)
CALCIUM SPEC-SCNC: 8.6 MG/DL (ref 8.6–10.5)
CHLORIDE SERPL-SCNC: 101 MMOL/L (ref 98–107)
CO2 SERPL-SCNC: 27.8 MMOL/L (ref 22–29)
CREAT SERPL-MCNC: 1.12 MG/DL (ref 0.76–1.27)
DEPRECATED RDW RBC AUTO: 40.1 FL (ref 37–54)
EOSINOPHIL # BLD AUTO: 0.19 10*3/MM3 (ref 0–0.4)
EOSINOPHIL NFR BLD AUTO: 2 % (ref 0.3–6.2)
ERYTHROCYTE [DISTWIDTH] IN BLOOD BY AUTOMATED COUNT: 12.7 % (ref 12.3–15.4)
GFR SERPL CREATININE-BSD FRML MDRD: 69 ML/MIN/1.73
GLUCOSE SERPL-MCNC: 113 MG/DL (ref 65–99)
HCT VFR BLD AUTO: 40.6 % (ref 37.5–51)
HGB BLD-MCNC: 14.3 G/DL (ref 13–17.7)
IMM GRANULOCYTES # BLD AUTO: 0.02 10*3/MM3 (ref 0–0.05)
IMM GRANULOCYTES NFR BLD AUTO: 0.2 % (ref 0–0.5)
LYMPHOCYTES # BLD AUTO: 3.9 10*3/MM3 (ref 0.7–3.1)
LYMPHOCYTES NFR BLD AUTO: 40.4 % (ref 19.6–45.3)
MAGNESIUM SERPL-MCNC: 1.9 MG/DL (ref 1.6–2.6)
MCH RBC QN AUTO: 30.9 PG (ref 26.6–33)
MCHC RBC AUTO-ENTMCNC: 35.2 G/DL (ref 31.5–35.7)
MCV RBC AUTO: 87.7 FL (ref 79–97)
MONOCYTES # BLD AUTO: 0.49 10*3/MM3 (ref 0.1–0.9)
MONOCYTES NFR BLD AUTO: 5.1 % (ref 5–12)
NEUTROPHILS NFR BLD AUTO: 4.99 10*3/MM3 (ref 1.7–7)
NEUTROPHILS NFR BLD AUTO: 51.7 % (ref 42.7–76)
NRBC BLD AUTO-RTO: 0 /100 WBC (ref 0–0.2)
PHOSPHATE SERPL-MCNC: 3.7 MG/DL (ref 2.5–4.5)
PLATELET # BLD AUTO: 213 10*3/MM3 (ref 140–450)
PMV BLD AUTO: 9.5 FL (ref 6–12)
POTASSIUM SERPL-SCNC: 4 MMOL/L (ref 3.5–5.2)
RBC # BLD AUTO: 4.63 10*6/MM3 (ref 4.14–5.8)
SODIUM SERPL-SCNC: 136 MMOL/L (ref 136–145)
WBC NRBC COR # BLD: 9.65 10*3/MM3 (ref 3.4–10.8)

## 2022-01-12 PROCEDURE — 84100 ASSAY OF PHOSPHORUS: CPT | Performed by: HOSPITALIST

## 2022-01-12 PROCEDURE — 99239 HOSP IP/OBS DSCHRG MGMT >30: CPT | Performed by: INTERNAL MEDICINE

## 2022-01-12 PROCEDURE — 99231 SBSQ HOSP IP/OBS SF/LOW 25: CPT | Performed by: INTERNAL MEDICINE

## 2022-01-12 PROCEDURE — 36415 COLL VENOUS BLD VENIPUNCTURE: CPT | Performed by: HOSPITALIST

## 2022-01-12 PROCEDURE — 83735 ASSAY OF MAGNESIUM: CPT | Performed by: HOSPITALIST

## 2022-01-12 PROCEDURE — 80048 BASIC METABOLIC PNL TOTAL CA: CPT | Performed by: HOSPITALIST

## 2022-01-12 PROCEDURE — 85025 COMPLETE CBC W/AUTO DIFF WBC: CPT | Performed by: HOSPITALIST

## 2022-01-12 RX ADMIN — SODIUM CHLORIDE, PRESERVATIVE FREE 10 ML: 5 INJECTION INTRAVENOUS at 08:21

## 2022-01-12 RX ADMIN — APIXABAN 5 MG: 5 TABLET, FILM COATED ORAL at 08:21

## 2022-01-12 NOTE — PROGRESS NOTES
CARDIOLOGY  INPATIENT PROGRESS NOTE                [facility] UNIT 2    1/12/2022      PATIENT IDENTIFICATION:   Name:  Jorje Toro      MRN:  [id number]     51 y.o.  male                 SUBJECTIVE:   She is doing well this morning converted to normal sinus rhythm late last night    OBJECTIVE:  Vitals:    01/12/22 0000 01/12/22 0100 01/12/22 0300 01/12/22 0811   BP: 95/65 106/66 96/66 98/57   BP Location: Left arm Right arm Right arm Left arm   Patient Position: Lying Lying Lying Lying   Pulse: 76  57 60   Resp: 16  16 18   Temp: 98.1 °F (36.7 °C)  98.9 °F (37.2 °C) 97.8 °F (36.6 °C)   TempSrc: Oral  Oral Oral   SpO2: 98%  99% 100%   Weight:       Height:               Body mass index is 22.73 kg/m².    Intake/Output Summary (Last 24 hours) at 1/12/2022 0854  Last data filed at 1/12/2022 0100  Gross per 24 hour   Intake 499.47 ml   Output --   Net 499.47 ml       Telemetry: Normal sinus rhythm    Physical Exam  General Appearance:   · no acute distress  · Alert and oriented x3  HENT:   · lips not cyanotic  · Atraumatic  Neck:  · No jvd   · supple  Respiratory:  · no respiratory distress  · normal breath sounds  · no rales  Cardiovascular:    · regular rhythm  · no S3, no S4   · no murmur  · no rub  · lower extremity edema: none    Skin:   · warm, dry  · No rashes      No Known Allergies  Scheduled meds:  apixaban, 5 mg, Oral, Q12H  senna-docusate sodium, 2 tablet, Oral, BID  sodium chloride, 10 mL, Intravenous, Q12H      IV meds:                      amiodarone, 0.5 mg/min, Last Rate: 0.5 mg/min (01/11/22 1638)      Data Review:  CBC    CBC 1/11/22 1/11/22 1/12/22    0149 1059    WBC 9.54 10.06 9.65   RBC 4.95 4.92 4.63   Hemoglobin 15.3 15.2 14.3   Hematocrit 43.4 43.1 40.6   MCV 87.7 87.6 87.7   MCH 30.9 30.9 30.9   MCHC 35.3 35.3 35.2   RDW 12.4 12.6 12.7   Platelets 236 254 213           CMP    CMP 1/11/22 1/11/22 1/12/22    0149 1058    Glucose 133 (A) 178 (A) 113 (A)   BUN 14 10 9    Creatinine 0.87 0.83 1.12   eGFR Non  Am 92 97 69   Sodium 133 (A) 135 (A) 136   Potassium 3.8 3.8 4.0   Chloride 96 (A) 100 101   Calcium 8.7 8.8 8.6   Albumin 4.60     Total Bilirubin 0.5     Alkaline Phosphatase 92     AST (SGOT) 19     ALT (SGPT) 13     (A) Abnormal value       Comments are available for some flowsheets but are not being displayed.            CARDIAC LABS:      Lab 01/11/22  0149   TROPONIN T <0.010        No results found for: DIGOXIN   Lab Results   Component Value Date    TSH 2.560 01/11/2022           Invalid input(s): LDLCALC  Lab Results   Component Value Date    POCTROP 0.01 01/11/2022     Lab Results   Component Value Date    TROPONINT <0.010 01/11/2022   (  Lab Results   Component Value Date    MG 1.9 01/12/2022     Results for orders placed during the hospital encounter of 01/11/22    Adult Transthoracic Echo Complete w/ Color, Spectral and Contrast if necessary per protocol    Interpretation Summary  · Estimated left ventricular EF was in agreement with the calculated left ventricular EF. Left ventricular ejection fraction appears to be 56 - 60%. Left ventricular systolic function is normal.  · Left ventricular diastolic function is consistent with (grade III w/high LAP) fixed restrictive pattern.  · Estimated right ventricular systolic pressure from tricuspid regurgitation is normal (<35 mmHg). Calculated right ventricular systolic pressure from tricuspid regurgitation is 10 mmHg.  · Mild centrally directed mitral valve regurgitation is noted           ASSESSMENT:    New onset atrial fibrillation (HCC)    Syncope and collapse        PLAN:  1.  Continue with Eliquis 5 twice daily for the next month and then does go to chronic aspirin 81 mg once a day  2.  Discontinue beta-blocker due to low blood pressure and likely vasovagal episode on admission encourage fluid hydration follow back up in clinic in 1 month we will sign off          Jack Perez MD  1/12/2022    08:55  EST

## 2022-01-12 NOTE — PLAN OF CARE
Goal Outcome Evaluation:      Patients only complaints was nausea at the beginning of shift, patient requested an oral dose of phenergan. Patient received phenergan and rested well, nausea improved.    Patient converted 0031. Patients AMIODARONE GTT finished at 0430. Patients Pressures were soft and Heart Rate SB 52 held 0400 Metoprolol dose. No new complaints will continue to monitor patient.        Will continue to monitor patient.     View Amiodarone gtt vitals under notes, Paper to be scanned in patients chart.

## 2022-01-12 NOTE — NURSING NOTE
At 0031 Patient converted to Sinus Mac HR currently 56. Will continue to monitor patient.     Amiodarone gtt still running. Last rate change was 1638 - to 16.67

## 2022-01-13 ENCOUNTER — READMISSION MANAGEMENT (OUTPATIENT)
Dept: CALL CENTER | Facility: HOSPITAL | Age: 52
End: 2022-01-13

## 2022-01-13 NOTE — OUTREACH NOTE
Prep Survey      Responses   [facility] patient discharged from? [facility]   Is LACE score < 7 ? Yes   Emergency Room discharge w/ pulse ox? No   Eligibility Not Eligible   What are the reasons patient is not eligible? Other   Does the patient have one of the following disease processes/diagnoses(primary or secondary)? Other   Prep survey completed? Yes          Jacinta Amanda RN

## 2022-01-13 NOTE — PAYOR COMM NOTE
"Elizabeth Prince (51 y.o. Male)             Date of Birth Social Security Number Address Home Phone MRN    1970  [address]  [place] [address] [phone] [id number]    [place] Marital Status             Mosque        Admission Date Admission Type Admitting Provider Attending Provider Department, Room/Bed    22 Emergency Wilberto Bass MD  [facility] PROGRESSIVE CARE UNIT 2, 255/1    Discharge Date Discharge Disposition Discharge Destination          2022 Home or Self Care              Attending Provider: (none)   Allergies: No Known Allergies    Isolation: None   Infection: None   Code Status: Prior   Advance Care Planning Activity    Ht: 193 cm (76\")   Wt: 84.7 kg (186 lb 11.7 oz)    Admission Cmt: None   Principal Problem: None                Active Insurance as of 2022     Primary Coverage     Payor Plan Insurance Group Employer/Plan Group    [facility]      Payor Plan Address Payor Plan Phone Number Payor Plan Fax Number Effective Dates    [address] [phone]  2022 - None Entered    [employer] [id number]       Subscriber Name Subscriber Birth Date Member ID       ELIZABETH PRINCE 1970 [id number]                 Emergency Contacts      (Rel.) Home Phone Work Phone Mobile Phone    deena prince (Spouse) -- -- [phone]    yolandaadolph (Daughter) [phone] -- --    princehira (Daughter) -- -- [phone]        #[id number]========cu'd           Discharge Summary      Wilberto Bass MD at [address] [place] [address]  DISCHARGE SUMMARY    Patient Name: Elizabeth Prince  : 1970  MRN: [id number]    Date of Admission: 2022  Date of Discharge:  22  Primary Care Physician: Wilma Chairez MD    Consultants:  -Cardiology: Dr. Jack Perez    [facility] Problems:  New onset atrial fibrillation converted to sinus rhythm  Syncope and collapse " consistent with vasovagal spell  Head injury after syncope    Hospital Course     Hospital Course:  Jorje Toro is a 51 y.o. male with no significant past medical history who presented to ED after syncopal episode x2 at home.  Evaluation in ED significant for patient being in atrial fibrillation, new diagnosis for the patient.  Imaging negative in the ED, CT scan did not show any acute fracture or subluxation.  Patient started on metoprolol and cardiology consulted to assist in management.  Patient started on amiodarone drip per cardiology and patient converted to sinus rhythm.  Metoprolol discontinued.  Patient started on apixaban per cardiology.  Patient hemodynamically stable with no additional inpatient evaluation or work-up necessary at this time.  Patient will continue on apixaban for minimum of 1 month and then plan to transition to aspirin 81 mg daily if patient remains in sinus rhythm.  Patient will follow-up with PCP in 3 to 5 days and cardiology in 1 month.      DISCHARGE Follow Up Recommendations for labs and diagnostics:   -Follow-up with PCP in 3 to 5 days  -Follow-up with cardiology in 3-4 weeks    Day of Discharge     Vital Signs:  Temp:  [97.8 °F (36.6 °C)-98.9 °F (37.2 °C)] 98 °F (36.7 °C)  Heart Rate:  [57-76] 64  Resp:  [16-20] 20  BP: ()/(57-76) 106/64  Physical Exam:   Gen: No acute distress, Conversant, Pleasant, ambulating in room  HEENT: MMM, Atraumatic  Neck: Supple, Trachea midline  Resp: CTAB, No w/r/r, equal chest bilaterally, normal respiratory effort  Card: RRR, No m/r/g  Abd: Soft, Nontender, Nondistended, + bowel sounds  Ext: No cyanosis, No clubbing  Neuro: CN II-XII grossly intact, No focal deficits appreciated  Psych: AAO x 3, Normal mood, Normal affect    Discharge Details        Discharge Medications      New Medications      Instructions Start Date   apixaban 5 MG tablet tablet  Commonly known as: ELIQUIS   5 mg, Oral, 2 Times Daily             No Known  Allergies    Discharge Disposition:  Home or Self Care    Diet:  Hospital:No active diet order      Discharge Activity:   Activity Instructions     Activity as Tolerated            CODE STATUS:  Code Status and Medical Interventions:   Ordered at: 01/11/22 0356     Level Of Support Discussed With:    Patient     Code Status (Patient has no pulse and is not breathing):    CPR (Attempt to Resuscitate)     Medical Interventions (Patient has pulse or is breathing):    Full Support       Future Appointments   Date Time Provider Department Center   1/27/2022  9:30 AM Jack [facility] MD [facility]       Additional Instructions for the Follow-ups that You Need to Schedule     Discharge Follow-up with PCP   As directed       Currently Documented PCP:    Wilma Chairez MD    PCP Phone Number:    [phone]     Follow Up Details: Follow up in 3-5 days         Discharge Follow-up with Specified Provider: Dr. Jack Perez; 3 Weeks   As directed      To: Dr. Jack Perez    Follow Up: 3 Weeks               Pertinent  and/or Most Recent Results     RADIOLOGY:  CT Head Without Contrast [[id number]] Doug as Reviewed   Order Status: Completed Collected: 01/11/22 0307    Updated: 01/11/22 0310   Narrative:     PROCEDURE: CT HEAD WO CONTRAST       COMPARISON: None   INDICATIONS: HEAD INJURY, LOSS OF CONSCIOUSNESS       PROTOCOL:   Standard imaging protocol performed       RADIATION:       MA and/or KV was adjusted to minimize radiation dose.           TECHNIQUE: After obtaining the patient's consent, CT images were obtained without non-ionic   intravenous contrast material.       FINDINGS:   There is no evidence for acute intracranial hemorrhage. No definitive acute focal ischemia is   observed. There is no evidence for abnormal cerebral edema. No mass effect or midline shift is   seen. The ventricular system is nondilated. The basilar cisterns are patent. The skull is intact   without displaced fracture. The paranasal sinuses and  mastoid air cells are clear.       Impression:       1. No evidence for acute intracranial abnormality.            SMITA LOJA MD         Electronically Signed and Approved By: SMITA LOJA MD on 1/11/2022 at 3:07                      CT Cervical Spine Without Contrast [[id number]] Doug as Reviewed   Order Status: Completed Collected: 01/11/22 0309    Updated: 01/11/22 0312   Narrative:     PROCEDURE: CT CERVICAL SPINE WO CONTRAST       COMPARISON: None       INDICATIONS: Neck pain, acute, no red flags       PROTOCOL:   Standard imaging protocol performed       RADIATION:   DLP: [id number]*cm     MA and/or KV was adjusted to minimize radiation dose.           TECHNIQUE: After obtaining the patient's consent, multi-planar CT images were created without   contrast material.         FINDINGS:   There is no acute fracture or subluxation. The cervical vertebral body alignment is within normal   limits. No focal osseous abnormalities are seen. The central canal and neural foramina are patent.   There is no evidence for significant soft tissue hemorrhage or hematoma. No significant edema or   abnormal fluid collection is seen. The mastoid air cells are clear. The lung apices are clear.       Impression:     1. No evidence for acute fracture or subluxation.   2. No evidence for significant acute soft tissue abnormality.                SMITA LOJA MD         Electronically Signed and Approved By: SMITA LOJA MD on 1/11/2022 at 3:09                      XR Chest 1 View [[id number]] Doug as Reviewed   Order Status: Completed Collected: 01/11/22 0218    Updated: 01/11/22 0221   Narrative:     PROCEDURE: XR CHEST 1 VW       COMPARISON: None       INDICATIONS: SYNCOPE, FALL, HEADACHE       FINDINGS:   No consolidations or pleural effusions are observed. The cardiac silhouette is within normal   limits. The mediastinum is unremarkable. No definitive acute osseous abnormalities are seen on this   single view.        Impression:       1. No evidence for acute cardiopulmonary process.                        SMITA LOJA MD         Electronically Signed and Approved By: SMITA LOJA MD on 1/11/2022 at 2:18          LAB RESULTS:      Lab 01/12/22  0412 01/11/22  1059 01/11/22  0149   WBC 9.65 10.06 9.54   HEMOGLOBIN 14.3 15.2 15.3   HEMATOCRIT 40.6 43.1 43.4   PLATELETS 213 254 236   NEUTROS ABS 4.99 7.62* 6.34   IMMATURE GRANS (ABS) 0.02 0.05 0.03   LYMPHS ABS 3.90* 1.92 2.39   MONOS ABS 0.49 0.37 0.57   EOS ABS 0.19 0.06 0.17   MCV 87.7 87.6 87.7         Lab 01/12/22  0412 01/11/22  1058 01/11/22  0149   SODIUM 136 135* 133*   POTASSIUM 4.0 3.8 3.8   CHLORIDE 101 100 96*   CO2 27.8 24.4 26.5   ANION GAP 7.2 10.6 10.5   BUN 9 10 14   CREATININE 1.12 0.83 0.87   GLUCOSE 113* 178* 133*   CALCIUM 8.6 8.8 8.7   MAGNESIUM 1.9 2.0 2.1   PHOSPHORUS 3.7 3.3  --    TSH  --   --  2.560         Lab 01/11/22  0149   TOTAL PROTEIN 6.9   ALBUMIN 4.60   GLOBULIN 2.3   ALT (SGPT) 13   AST (SGOT) 19   BILIRUBIN 0.5   ALK PHOS 92         Lab 01/11/22  0149   TROPONIN T <0.010                 Brief Urine Lab Results  (Last result in the past 365 days)      Color   Clarity   Blood   Leuk Est   Nitrite   Protein   CREAT   Urine HCG        01/11/22 0402 Yellow   Clear   Negative   Negative   Negative   Negative               Microbiology Results (last 10 days)     ** No results found for the last 240 hours. **          CT Head Without Contrast    Result Date: 1/11/2022  Impression:   1. No evidence for acute intracranial abnormality.    SMITA LOJA MD       Electronically Signed and Approved By: SMITA LOJA MD on 1/11/2022 at 3:07             CT Cervical Spine Without Contrast    Result Date: 1/11/2022  Impression: 1. No evidence for acute fracture or subluxation. 2. No evidence for significant acute soft tissue abnormality.     SMITA LOJA MD       Electronically Signed and Approved By: SMITA LOJA MD on 1/11/2022 at 3:09              XR Chest 1 View    Result Date: 1/11/2022  Impression:   1. No evidence for acute cardiopulmonary process.       SMITA LOJA MD       Electronically Signed and Approved By: SMITA LOJA MD on 1/11/2022 at 2:18                       Results for orders placed during the hospital encounter of 01/11/22    Adult Transthoracic Echo Complete w/ Color, Spectral and Contrast if necessary per protocol    Interpretation Summary  · Estimated left ventricular EF was in agreement with the calculated left ventricular EF. Left ventricular ejection fraction appears to be 56 - 60%. Left ventricular systolic function is normal.  · Left ventricular diastolic function is consistent with (grade III w/high LAP) fixed restrictive pattern.  · Estimated right ventricular systolic pressure from tricuspid regurgitation is normal (<35 mmHg). Calculated right ventricular systolic pressure from tricuspid regurgitation is 10 mmHg.  · Mild centrally directed mitral valve regurgitation is noted        Time spent on Discharge including face to face service: Greater than 30 minutes    Electronically signed by Wilberto Bass MD, 01/12/22, 7:05 PM EST.      Electronically signed by Wilberto Bass MD at 01/12/22 5606

## 2022-01-13 NOTE — DISCHARGE SUMMARY
[facility]         HOSPITALIST  DISCHARGE SUMMARY    Patient Name: Jorje Toro  : 1970  MRN: [id number]    Date of Admission: 2022  Date of Discharge:  22  Primary Care Physician: Wilma Chairez MD    Consultants:  -Cardiology: Dr. Jack Perez    [facility] Problems:  New onset atrial fibrillation converted to sinus rhythm  Syncope and collapse consistent with vasovagal spell  Head injury after syncope    Hospital Course     Hospital Course:  Jorje Toro is a 51 y.o. male with no significant past medical history who presented to ED after syncopal episode x2 at home.  Evaluation in ED significant for patient being in atrial fibrillation, new diagnosis for the patient.  Imaging negative in the ED, CT scan did not show any acute fracture or subluxation.  Patient started on metoprolol and cardiology consulted to assist in management.  Patient started on amiodarone drip per cardiology and patient converted to sinus rhythm.  Metoprolol discontinued.  Patient started on apixaban per cardiology.  Patient hemodynamically stable with no additional inpatient evaluation or work-up necessary at this time.  Patient will continue on apixaban for minimum of 1 month and then plan to transition to aspirin 81 mg daily if patient remains in sinus rhythm.  Patient will follow-up with PCP in 3 to 5 days and cardiology in 1 month.      DISCHARGE Follow Up Recommendations for labs and diagnostics:   -Follow-up with PCP in 3 to 5 days  -Follow-up with cardiology in 3-4 weeks    Day of Discharge     Vital Signs:  Temp:  [97.8 °F (36.6 °C)-98.9 °F (37.2 °C)] 98 °F (36.7 °C)  Heart Rate:  [57-76] 64  Resp:  [16-20] 20  BP: ()/(57-76) 106/64  Physical Exam:   Gen: No acute distress, Conversant, Pleasant, ambulating in room  HEENT: MMM, Atraumatic  Neck: Supple, Trachea midline  Resp: CTAB, No w/r/r, equal chest bilaterally, normal respiratory effort  Card: RRR, No m/r/g  Abd: Soft, Nontender,  Nondistended, + bowel sounds  Ext: No cyanosis, No clubbing  Neuro: CN II-XII grossly intact, No focal deficits appreciated  Psych: AAO x 3, Normal mood, Normal affect    Discharge Details        Discharge Medications      New Medications      Instructions Start Date   apixaban 5 MG tablet tablet  Commonly known as: ELIQUIS   5 mg, Oral, 2 Times Daily             No Known Allergies    Discharge Disposition:  Home or Self Care    Diet:  Hospital:No active diet order      Discharge Activity:   Activity Instructions     Activity as Tolerated            CODE STATUS:  Code Status and Medical Interventions:   Ordered at: 01/11/22 0356     Level Of Support Discussed With:    Patient     Code Status (Patient has no pulse and is not breathing):    CPR (Attempt to Resuscitate)     Medical Interventions (Patient has pulse or is breathing):    Full Support       Future Appointments   Date Time Provider Department Center   1/27/2022  9:30 AM Jack Perez MD [facility] [place] [facility]       Additional Instructions for the Follow-ups that You Need to Schedule     Discharge Follow-up with PCP   As directed       Currently Documented PCP:    Wilma Chairez MD    PCP Phone Number:    [phone]     Follow Up Details: Follow up in 3-5 days         Discharge Follow-up with Specified Provider: Dr. Jack Perez; 3 Weeks   As directed      To: Dr. Jack Perez    Follow Up: 3 Weeks               Pertinent  and/or Most Recent Results     RADIOLOGY:  CT Head Without Contrast [[id number]] Doug as Reviewed   Order Status: Completed Collected: 01/11/22 0307    Updated: 01/11/22 0310   Narrative:     PROCEDURE: CT HEAD WO CONTRAST       COMPARISON: None   INDICATIONS: HEAD INJURY, LOSS OF CONSCIOUSNESS       PROTOCOL:   Standard imaging protocol performed       RADIATION:       MA and/or KV was adjusted to minimize radiation dose.           TECHNIQUE: After obtaining the patient's consent, CT images were obtained without non-ionic   intravenous contrast  material.       FINDINGS:   There is no evidence for acute intracranial hemorrhage. No definitive acute focal ischemia is   observed. There is no evidence for abnormal cerebral edema. No mass effect or midline shift is   seen. The ventricular system is nondilated. The basilar cisterns are patent. The skull is intact   without displaced fracture. The paranasal sinuses and mastoid air cells are clear.       Impression:       1. No evidence for acute intracranial abnormality.            SMITA LOJA MD         Electronically Signed and Approved By: SMITA LOJA MD on 1/11/2022 at 3:07                      CT Cervical Spine Without Contrast [[id number]] Doug as Reviewed   Order Status: Completed Collected: 01/11/22 0309    Updated: 01/11/22 0312   Narrative:     PROCEDURE: CT CERVICAL SPINE WO CONTRAST       COMPARISON: None       INDICATIONS: Neck pain, acute, no red flags       PROTOCOL:   Standard imaging protocol performed       RADIATION:   DLP: 63651mTe*cm     MA and/or KV was adjusted to minimize radiation dose.           TECHNIQUE: After obtaining the patient's consent, multi-planar CT images were created without   contrast material.         FINDINGS:   There is no acute fracture or subluxation. The cervical vertebral body alignment is within normal   limits. No focal osseous abnormalities are seen. The central canal and neural foramina are patent.   There is no evidence for significant soft tissue hemorrhage or hematoma. No significant edema or   abnormal fluid collection is seen. The mastoid air cells are clear. The lung apices are clear.       Impression:     1. No evidence for acute fracture or subluxation.   2. No evidence for significant acute soft tissue abnormality.                SMITA LOJA MD         Electronically Signed and Approved By: SMITA LOJA MD on 1/11/2022 at 3:09                      XR Chest 1 View [[id number]] Doug as Reviewed   Order Status: Completed Collected: 01/11/22 0218     Updated: 01/11/22 0221   Narrative:     PROCEDURE: XR CHEST 1 VW       COMPARISON: None       INDICATIONS: SYNCOPE, FALL, HEADACHE       FINDINGS:   No consolidations or pleural effusions are observed. The cardiac silhouette is within normal   limits. The mediastinum is unremarkable. No definitive acute osseous abnormalities are seen on this   single view.       Impression:       1. No evidence for acute cardiopulmonary process.                        SMITA LOJA MD         Electronically Signed and Approved By: SMITA LOJA MD on 1/11/2022 at 2:18          LAB RESULTS:      Lab 01/12/22  0412 01/11/22  1059 01/11/22 0149   WBC 9.65 10.06 9.54   HEMOGLOBIN 14.3 15.2 15.3   HEMATOCRIT 40.6 43.1 43.4   PLATELETS 213 254 236   NEUTROS ABS 4.99 7.62* 6.34   IMMATURE GRANS (ABS) 0.02 0.05 0.03   LYMPHS ABS 3.90* 1.92 2.39   MONOS ABS 0.49 0.37 0.57   EOS ABS 0.19 0.06 0.17   MCV 87.7 87.6 87.7         Lab 01/12/22  0412 01/11/22  1058 01/11/22 0149   SODIUM 136 135* 133*   POTASSIUM 4.0 3.8 3.8   CHLORIDE 101 100 96*   CO2 27.8 24.4 26.5   ANION GAP 7.2 10.6 10.5   BUN 9 10 14   CREATININE 1.12 0.83 0.87   GLUCOSE 113* 178* 133*   CALCIUM 8.6 8.8 8.7   MAGNESIUM 1.9 2.0 2.1   PHOSPHORUS 3.7 3.3  --    TSH  --   --  2.560         Lab 01/11/22  0149   TOTAL PROTEIN 6.9   ALBUMIN 4.60   GLOBULIN 2.3   ALT (SGPT) 13   AST (SGOT) 19   BILIRUBIN 0.5   ALK PHOS 92         Lab 01/11/22  0149   TROPONIN T <0.010                 Brief Urine Lab Results  (Last result in the past 365 days)      Color   Clarity   Blood   Leuk Est   Nitrite   Protein   CREAT   Urine HCG        01/11/22 0402 Yellow   Clear   Negative   Negative   Negative   Negative               Microbiology Results (last 10 days)     ** No results found for the last 240 hours. **          CT Head Without Contrast    Result Date: 1/11/2022  Impression:   1. No evidence for acute intracranial abnormality.    SMITA LOJA MD       Electronically Signed and  Approved By: SMITA LOJA MD on 1/11/2022 at 3:07             CT Cervical Spine Without Contrast    Result Date: 1/11/2022  Impression: 1. No evidence for acute fracture or subluxation. 2. No evidence for significant acute soft tissue abnormality.     SMITA LOJA MD       Electronically Signed and Approved By: SMITA LOJA MD on 1/11/2022 at 3:09             XR Chest 1 View    Result Date: 1/11/2022  Impression:   1. No evidence for acute cardiopulmonary process.       SMITA LOJA MD       Electronically Signed and Approved By: SMITA LOJA MD on 1/11/2022 at 2:18                       Results for orders placed during the hospital encounter of 01/11/22    Adult Transthoracic Echo Complete w/ Color, Spectral and Contrast if necessary per protocol    Interpretation Summary  · Estimated left ventricular EF was in agreement with the calculated left ventricular EF. Left ventricular ejection fraction appears to be 56 - 60%. Left ventricular systolic function is normal.  · Left ventricular diastolic function is consistent with (grade III w/high LAP) fixed restrictive pattern.  · Estimated right ventricular systolic pressure from tricuspid regurgitation is normal (<35 mmHg). Calculated right ventricular systolic pressure from tricuspid regurgitation is 10 mmHg.  · Mild centrally directed mitral valve regurgitation is noted        Time spent on Discharge including face to face service: Greater than 30 minutes    Electronically signed by Wilberto Bass MD, 01/12/22, 7:05 PM EST.

## 2022-01-27 ENCOUNTER — OFFICE VISIT (OUTPATIENT)
Dept: CARDIOLOGY | Facility: CLINIC | Age: 52
End: 2022-01-27

## 2022-01-27 VITALS
WEIGHT: 189 LBS | HEART RATE: 66 BPM | DIASTOLIC BLOOD PRESSURE: 82 MMHG | SYSTOLIC BLOOD PRESSURE: 124 MMHG | HEIGHT: 76 IN | BODY MASS INDEX: 23.02 KG/M2

## 2022-01-27 DIAGNOSIS — I48.0 PAF (PAROXYSMAL ATRIAL FIBRILLATION): ICD-10-CM

## 2022-01-27 DIAGNOSIS — R55 SYNCOPE AND COLLAPSE: ICD-10-CM

## 2022-01-27 DIAGNOSIS — I48.91 NEW ONSET ATRIAL FIBRILLATION: Primary | ICD-10-CM

## 2022-01-27 PROCEDURE — 99214 OFFICE O/P EST MOD 30 MIN: CPT | Performed by: INTERNAL MEDICINE

## 2022-01-27 NOTE — ASSESSMENT & PLAN NOTE
Consistent with vasovagal syncope no recurrent episodes echocardiogram and telemetry monitoring the hospital unremarkable except for A. fib which converted if further recurrent episodes would recommend long-term monitoring to also encourage patient to get an apple watch from home monitoring both for A. fib as well as for any other dysrhythmias

## 2022-01-27 NOTE — ASSESSMENT & PLAN NOTE
Patient with no symptomatic recurrences of A. fib did recommend that after a month of Eliquis he can go to just aspirin 81 mg preventatively do not see any reason for rate control medications at this point.  This episode may have been vagally mediated after a syncopal episode.  Did recommend checking plain treadmill stress test before resuming exercise  Counseled patient for avoidance of atrial fibrillation to  · Avoid alcohol consumption  · Aerobic activity 30 minutes or greater per day 5 times per week

## 2022-01-27 NOTE — PROGRESS NOTES
"Chief Complaint  Atrial Fibrillation    Subjective    She has been doing well symptomatically with no recurrent tachycardic problems or syncopal episodes he does report feeling more aware of his heart in general denies any chest discomfort    Past Medical History:   Diagnosis Date   • Atrial fibrillation (HCC)          Current Outpatient Medications:   •  apixaban (ELIQUIS) 5 MG tablet tablet, Take 1 tablet by mouth 2 (Two) Times a Day. Indications: Atrial Fibrillation, Disp: 60 tablet, Rfl: 0    There are no discontinued medications.  No Known Allergies     Social History     Tobacco Use   • Smoking status: Never Smoker   • Smokeless tobacco: Never Used   Vaping Use   • Vaping Use: Never used   Substance Use Topics   • Alcohol use: Yes     Comment: occasionally   • Drug use: Never       Family History   Problem Relation Age of Onset   • Hyperlipidemia Maternal Grandfather    • Cancer Maternal Grandfather         Objective     /82   Pulse 66   Ht 193 cm (76\")   Wt 85.7 kg (189 lb)   BMI 23.01 kg/m²       Physical Exam    General Appearance:   · no acute distress  · Alert and oriented x3  HENT:   · lips not cyanotic  · Atraumatic  Neck:  · No jvd   · supple  Respiratory:  · no respiratory distress  · normal breath sounds  · no rales  Cardiovascular:  · Regular rate and rhythm  · no S3, no S4   · no murmur  · no rub  Extremities  · No cyanosis  · lower extremity edema: none    Skin:   · warm, dry  · No rashes      Result Review :     No results found for: PROBNP  CMP    CMP 1/11/22 1/11/22 1/12/22    0149 1058    Glucose 133 (A) 178 (A) 113 (A)   BUN 14 10 9   Creatinine 0.87 0.83 1.12   eGFR Non  Am 92 97 69   Sodium 133 (A) 135 (A) 136   Potassium 3.8 3.8 4.0   Chloride 96 (A) 100 101   Calcium 8.7 8.8 8.6   Albumin 4.60     Total Bilirubin 0.5     Alkaline Phosphatase 92     AST (SGOT) 19     ALT (SGPT) 13     (A) Abnormal value       Comments are available for some flowsheets but are not being " displayed.           CBC w/diff    CBC w/Diff 1/11/22 1/11/22 1/12/22    0149 1059    WBC 9.54 10.06 9.65   RBC 4.95 4.92 4.63   Hemoglobin 15.3 15.2 14.3   Hematocrit 43.4 43.1 40.6   MCV 87.7 87.6 87.7   MCH 30.9 30.9 30.9   MCHC 35.3 35.3 35.2   RDW 12.4 12.6 12.7   Platelets 236 254 213   Neutrophil Rel % 66.4 75.7 51.7   Immature Granulocyte Rel % 0.3 0.5 0.2   Lymphocyte Rel % 25.1 19.1 (A) 40.4   Monocyte Rel % 6.0 3.7 (A) 5.1   Eosinophil Rel % 1.8 0.6 2.0   Basophil Rel % 0.4 0.4 0.6   (A) Abnormal value             Lab Results   Component Value Date    TSH 2.560 01/11/2022      Lab Results   Component Value Date    FREET4 1.50 01/11/2022      No results found for: DDIMERQUANT  Magnesium   Date Value Ref Range Status   01/12/2022 1.9 1.6 - 2.6 mg/dL Final      No results found for: DIGOXIN   Lab Results   Component Value Date    TROPONINT <0.010 01/11/2022             Lab Results   Component Value Date    POCTROP 0.01 01/11/2022       Results for orders placed during the hospital encounter of 01/11/22    Adult Transthoracic Echo Complete w/ Color, Spectral and Contrast if necessary per protocol    Interpretation Summary  · Estimated left ventricular EF was in agreement with the calculated left ventricular EF. Left ventricular ejection fraction appears to be 56 - 60%. Left ventricular systolic function is normal.  · Left ventricular diastolic function is consistent with (grade III w/high LAP) fixed restrictive pattern.  · Estimated right ventricular systolic pressure from tricuspid regurgitation is normal (<35 mmHg). Calculated right ventricular systolic pressure from tricuspid regurgitation is 10 mmHg.  · Mild centrally directed mitral valve regurgitation is noted                 Diagnoses and all orders for this visit:    1. New onset atrial fibrillation (HCC) (Primary)  Assessment & Plan:  Patient with no symptomatic recurrences of A. fib did recommend that after a month of Eliquis he can go to just  aspirin 81 mg preventatively do not see any reason for rate control medications at this point.  This episode may have been vagally mediated after a syncopal episode.  Did recommend checking plain treadmill stress test before resuming exercise  Counseled patient for avoidance of atrial fibrillation to  · Avoid alcohol consumption  · Aerobic activity 30 minutes or greater per day 5 times per week        Orders:  -     Lipid Panel; Future  -     Treadmill Stress Test; Future    2. Syncope and collapse  Assessment & Plan:  Consistent with vasovagal syncope no recurrent episodes echocardiogram and telemetry monitoring the hospital unremarkable except for A. fib which converted if further recurrent episodes would recommend long-term monitoring to also encourage patient to get an apple watch from home monitoring both for A. fib as well as for any other dysrhythmias      3. PAF (paroxysmal atrial fibrillation) ([facility])  Assessment & Plan:  Patient with no symptomatic recurrences of A. fib did recommend that after a month of Eliquis he can go to just aspirin 81 mg preventatively do not see any reason for rate control medications at this point.  This episode may have been vagally mediated after a syncopal episode.  Did recommend checking plain treadmill stress test before resuming exercise  Counseled patient for avoidance of atrial fibrillation to  · Avoid alcohol consumption  · Aerobic activity 30 minutes or greater per day 5 times per week                Follow Up     Return in about 6 months (around 7/27/2022).          Patient was given instructions and counseling regarding his condition or for health maintenance advice. Please see specific information pulled into the AVS if appropriate.

## 2022-02-02 ENCOUNTER — TELEPHONE (OUTPATIENT)
Dept: CARDIOLOGY | Facility: CLINIC | Age: 52
End: 2022-02-02

## 2022-02-02 NOTE — TELEPHONE ENCOUNTER
----- Message from FEROZ Quinteros sent at 2/2/2022  8:28 AM EST -----  Notify pt stress test negative for ischemia, keep July follow up